# Patient Record
Sex: MALE | Race: WHITE
[De-identification: names, ages, dates, MRNs, and addresses within clinical notes are randomized per-mention and may not be internally consistent; named-entity substitution may affect disease eponyms.]

---

## 2018-04-17 ENCOUNTER — HOSPITAL ENCOUNTER (EMERGENCY)
Dept: HOSPITAL 17 - NEPE | Age: 28
LOS: 1 days | Discharge: HOME | End: 2018-04-18
Payer: COMMERCIAL

## 2018-04-17 VITALS
OXYGEN SATURATION: 98 % | SYSTOLIC BLOOD PRESSURE: 132 MMHG | HEART RATE: 70 BPM | RESPIRATION RATE: 16 BRPM | DIASTOLIC BLOOD PRESSURE: 86 MMHG

## 2018-04-17 VITALS
RESPIRATION RATE: 18 BRPM | HEART RATE: 73 BPM | TEMPERATURE: 99.3 F | SYSTOLIC BLOOD PRESSURE: 129 MMHG | DIASTOLIC BLOOD PRESSURE: 79 MMHG | OXYGEN SATURATION: 99 %

## 2018-04-17 VITALS — WEIGHT: 154.32 LBS | HEIGHT: 63 IN | BODY MASS INDEX: 27.34 KG/M2
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
## 2018-04-17 DIAGNOSIS — M25.511: ICD-10-CM

## 2018-04-17 DIAGNOSIS — S09.90XA: Primary | ICD-10-CM

## 2018-04-17 DIAGNOSIS — R07.9: ICD-10-CM

## 2018-04-17 DIAGNOSIS — M54.2: ICD-10-CM

## 2018-04-17 DIAGNOSIS — S60.512A: ICD-10-CM

## 2018-04-17 DIAGNOSIS — Z23: ICD-10-CM

## 2018-04-17 LAB
BASOPHILS # BLD AUTO: 0.1 TH/MM3 (ref 0–0.2)
BASOPHILS NFR BLD: 0.8 % (ref 0–2)
BUN SERPL-MCNC: 17 MG/DL (ref 7–18)
CALCIUM SERPL-MCNC: 8.8 MG/DL (ref 8.5–10.1)
CHLORIDE SERPL-SCNC: 108 MEQ/L (ref 98–107)
CREAT SERPL-MCNC: 1.11 MG/DL (ref 0.6–1.3)
EOSINOPHIL # BLD: 0.3 TH/MM3 (ref 0–0.4)
EOSINOPHIL NFR BLD: 3.2 % (ref 0–4)
ERYTHROCYTE [DISTWIDTH] IN BLOOD BY AUTOMATED COUNT: 12.9 % (ref 11.6–17.2)
GFR SERPLBLD BASED ON 1.73 SQ M-ARVRAT: 79 ML/MIN (ref 89–?)
GLUCOSE SERPL-MCNC: 84 MG/DL (ref 74–106)
HCO3 BLD-SCNC: 25.6 MEQ/L (ref 21–32)
HCT VFR BLD CALC: 47.2 % (ref 39–51)
HGB BLD-MCNC: 16.5 GM/DL (ref 13–17)
INR PPP: 1 RATIO
LYMPHOCYTES # BLD AUTO: 3 TH/MM3 (ref 1–4.8)
LYMPHOCYTES NFR BLD AUTO: 38.7 % (ref 9–44)
MCH RBC QN AUTO: 31.7 PG (ref 27–34)
MCHC RBC AUTO-ENTMCNC: 34.8 % (ref 32–36)
MCV RBC AUTO: 90.9 FL (ref 80–100)
MONOCYTE #: 0.7 TH/MM3 (ref 0–0.9)
MONOCYTES NFR BLD: 9 % (ref 0–8)
NEUTROPHILS # BLD AUTO: 3.7 TH/MM3 (ref 1.8–7.7)
NEUTROPHILS NFR BLD AUTO: 48.3 % (ref 16–70)
PLATELET # BLD: 224 TH/MM3 (ref 150–450)
PMV BLD AUTO: 9 FL (ref 7–11)
PROTHROMBIN TIME: 10 SEC (ref 9.8–11.6)
RBC # BLD AUTO: 5.2 MIL/MM3 (ref 4.5–5.9)
SODIUM SERPL-SCNC: 141 MEQ/L (ref 136–145)
WBC # BLD AUTO: 7.7 TH/MM3 (ref 4–11)

## 2018-04-17 PROCEDURE — 74177 CT ABD & PELVIS W/CONTRAST: CPT

## 2018-04-17 PROCEDURE — 71260 CT THORAX DX C+: CPT

## 2018-04-17 PROCEDURE — 96374 THER/PROPH/DIAG INJ IV PUSH: CPT

## 2018-04-17 PROCEDURE — 85610 PROTHROMBIN TIME: CPT

## 2018-04-17 PROCEDURE — 73030 X-RAY EXAM OF SHOULDER: CPT

## 2018-04-17 PROCEDURE — L0150 CERV SEMI-RIG ADJ MOLDED CHN: HCPCS

## 2018-04-17 PROCEDURE — 90471 IMMUNIZATION ADMIN: CPT

## 2018-04-17 PROCEDURE — 96375 TX/PRO/DX INJ NEW DRUG ADDON: CPT

## 2018-04-17 PROCEDURE — 85025 COMPLETE CBC W/AUTO DIFF WBC: CPT

## 2018-04-17 PROCEDURE — 99285 EMERGENCY DEPT VISIT HI MDM: CPT

## 2018-04-17 PROCEDURE — 85730 THROMBOPLASTIN TIME PARTIAL: CPT

## 2018-04-17 PROCEDURE — 72125 CT NECK SPINE W/O DYE: CPT

## 2018-04-17 PROCEDURE — 90714 TD VACC NO PRESV 7 YRS+ IM: CPT

## 2018-04-17 PROCEDURE — 72132 CT LUMBAR SPINE W/DYE: CPT

## 2018-04-17 PROCEDURE — 70450 CT HEAD/BRAIN W/O DYE: CPT

## 2018-04-17 PROCEDURE — 80048 BASIC METABOLIC PNL TOTAL CA: CPT

## 2018-04-17 PROCEDURE — 71045 X-RAY EXAM CHEST 1 VIEW: CPT

## 2018-04-17 PROCEDURE — 72129 CT CHEST SPINE W/DYE: CPT

## 2018-04-17 NOTE — RADRPT
EXAM DATE/TIME:  04/17/2018 23:00 

 

[facility] COMPARISON:     

No previous studies available for comparison.

 

                     

INDICATIONS :     

Short of breath, chest pain.

                     

 

MEDICAL HISTORY :     

None.          

 

SURGICAL HISTORY :     

None.   

 

ENCOUNTER:     

Initial                                        

 

ACUITY:     

1 day      

 

PAIN SCORE:     

0/10

 

LOCATION:     

Bilateral  chest

 

FINDINGS:     

A single view of the chest demonstrates the lungs to be symmetrically aerated without evidence of mas
s, infiltrate or effusion.  The cardiomediastinal contours are unremarkable.  Osseous structures are 
intact.

 

CONCLUSION:     

No acute abnormality demonstrated. Chest CT to follow.

 

 

 

 Dewayne Bardales MD on April 17, 2018 at 23:26           

Board Certified Radiologist.

 This report was verified electronically.

## 2018-04-17 NOTE — RADRPT
EXAM DATE/TIME:  04/17/2018 23:28 

 

[facility] COMPARISON:     

No previous studies available for comparison.

 

 

INDICATIONS :     

Trauma, fell off roof yesterday.

                      

 

IV CONTRAST:     

100 cc Omnipaque 350 (iohexol) IV ; Cumulative dose for multiple exams.

 

 

ORAL CONTRAST:      

No oral contrast ingested.

                      

 

RADIATION DOSE:     

9.96 CTDIvol (mGy) ; Combined studies - Thorax/Abdomen/Pelvis

 

 

MEDICAL HISTORY :     

None  

 

SURGICAL HISTORY :      

None. 

 

ENCOUNTER:      

Initial

 

ACUITY:      

2 days

 

PAIN SCALE:      

0/10

 

LOCATION:         

Abdomen. 

 

TECHNIQUE:     

Volumetric scanning of the abdomen and pelvis was performed.  Using automated exposure control and ad
justment of the mA and/or kV according to patient size, radiation dose was kept as low as reasonably 
achievable to obtain optimal diagnostic quality images.  DICOM format image data is available electro
nically for review and comparison.  

 

FINDINGS:     

 

LOWER LUNGS:     

The visualized lower lungs are clear.

 

LIVER:     

Homogeneous density without lesion.  There is no dilation of the biliary tree.  No calcified gallston
es.

 

SPLEEN:     

Normal size without lesion.

 

PANCREAS:     

Within normal limits.

 

KIDNEYS:     

Normal in size and shape.  There is no mass, stone or hydronephrosis.

 

ADRENAL GLANDS:     

Within normal limits.

 

VASCULAR:     

There is no aortic aneurysm.

 

BOWEL/MESENTERY:     

The stomach, small bowel, and colon demonstrate no acute abnormality.  There is no free intraperitone
al air or fluid.

 

ABDOMINAL WALL:     

Within normal limits.

 

RETROPERITONEUM:     

There is no lymphadenopathy. 

 

BLADDER:     

No wall thickening or mass. 

 

REPRODUCTIVE:     

Within normal limits.

 

INGUINAL:     

There is no lymphadenopathy or hernia. 

 

MUSCULOSKELETAL:     

Within normal limits for patient age. 

 

CONCLUSION:     

Negative trauma CT of the abdomen and pelvis.

 

 

 

 Dewayne Bardales MD on April 17, 2018 at 23:56           

Board Certified Radiologist.

 This report was verified electronically.

## 2018-04-17 NOTE — PD
HPI


Chief Complaint:  Fall


Time Seen by Provider:  22:49


Travel History


International Travel<30 days:  No


Contact w/Intl Traveler<30days:  No


Traveled to known affect area:  No





History of Present Illness


HPI


This is a 27-year-old male who presents by private vehicle for evaluation after 

fall.  Reports that yesterday at 3:30 PM he fell off the roof of a single-story 

house and landed on his right shoulder, hip his head against a fence.  Denies 

loss of consciousness.  He was sent home from work.  Since then he has 

developed worsening pain on the right side of his head, right side of his chest

, right shoulder, neck and back.  Pain is sharp and constant worse with 

movement.  Primary pain is right shoulder.  He denies shortness of breath, 

nausea or vomiting, dizziness, lightheadedness, denies numbness or tingling or 

weakness in extremities.  He denies any abdominal pain, pain in the lower 

extremities.  He is not on any blood thinning medications.  He reports a small 

abrasion on his left hand.  Last tetanus vaccination unknown.  No other 

complaints.





[facility]


Past Medical History


Medical History:  Denies Significant Hx


Diminished Hearing:  No


Tetanus Vaccination:  Unknown





Past Surgical History


Surgical History:  No Previous Surgery





Social History


Alcohol Use:  Yes (occasionally)


Tobacco Use:  No


Substance Use:  No





Allergies-Medications


(Allergen,Severity, Reaction):  


Coded Allergies:  


     No Known Allergies (Unverified , 4/17/18)


Reported Meds & Prescriptions





Reported Meds & Active Scripts


Active


Hydrocodone-Acetaminophen 5-325 mg Tab 1 Tab PO Q6H PRN








Review of Systems


Except as stated in HPI:  all other systems reviewed are Neg





Physical Exam


Narrative


GENERAL: This is well-developed well-nourished male in no acute distress.  

Cervical collar is in place.


SKIN: Warm and dry.  Small abrasion on the dorsum of the left hand.


HEAD: Atraumatic. Normocephalic. 


EYES: Pupils equal and round. No scleral icterus. No injection or drainage. 


ENT: No nasal bleeding or discharge.  Mucous membranes pink and moist.


NECK: Trachea midline. No JVD. 


CARDIOVASCULAR: Regular rate and rhythm.  No murmur appreciated.


RESPIRATORY: No accessory muscle use. Clear to auscultation. Breath sounds 

equal bilaterally. 


GASTROINTESTINAL: Abdomen soft, non-tender, nondistended. Hepatic and splenic 

margins not palpable. 


MUSCULOSKELETAL: No obvious deformities.  There is generalized tenderness to 

palpation of the right shoulder joint.  There is tenderness to palpation along 

the cervical thoracic and lumbar midline spine.  The patient has pain with 

range of motion activities of the right shoulder.  Right shoulder abduction, 

external rotation is slightly limited.  There is some tenderness to palpation 

of the anterior right upper chest wall.  There is no bony crepitus.


NEUROLOGICAL: Awake and alert. No obvious cranial nerve deficits.  Motor 

grossly within normal limits. Normal speech.





Data


Data


Last Documented VS





Vital Signs








  Date Time  Temp Pulse Resp B/P (MAP) Pulse Ox O2 Delivery O2 Flow Rate FiO2


 


4/18/18 01:54        


 


4/17/18 23:51  70 16  98 Room Air  


 


4/17/18 22:34 99.3       








Orders





 Orders


Chest, Single Ap (4/17/18 22:50)


Ct Brain W/O Iv Contrast(Rout) (4/17/18 22:50)


Ct Cerv Spine W/O Contrast (4/17/18 22:50)


Ct Thorax/ Chest W Iv Contrast (4/17/18 22:50)


Ct Thor Spine W Iv Contrast (4/17/18 22:50)


Ct Lumb Spine W Iv Contrast (4/17/18 22:50)


Apply Cervical Collar (4/17/18 22:50)


Iv Access Insert/Monitor (4/17/18 22:50)


Shoulder, Complete (>2vws) (4/17/18 )


Basic Metabolic Panel (Bmp) (4/17/18 22:50)


Complete Blood Count With Diff (4/17/18 22:50)


Prothrombin Time / Inr (Pt) (4/17/18 22:50)


Act Partial Throm Time (Ptt) (4/17/18 22:50)


Morphine Inj (Morphine Inj) (4/17/18 23:00)


Ondansetron Inj (Zofran Inj) (4/17/18 23:00)


Tetanus/Diphtheria Tox Adult (Tetanus/Di (4/17/18 23:00)


Shoulder, Complete (>2vws) (4/17/18 )


Iohexol 350 Inj (Omnipaque 350 Inj) (4/17/18 23:39)


Ct Abd/Pel W Iv Contrast(Rout) (4/17/18 )


Ice/Cold Pack (4/18/18 01:14)


Splint Or Brace Apply/Monitor (4/18/18 01:14)


Collar [place] (4/18/18 )


Ed Discharge Order (4/18/18 01:22)


Sling Cradle Arm (4/18/18 )





Labs





Laboratory Tests








Test


  4/17/18


22:58


 


White Blood Count 7.7 TH/MM3 


 


Red Blood Count 5.20 MIL/MM3 


 


Hemoglobin 16.5 GM/DL 


 


Hematocrit 47.2 % 


 


Mean Corpuscular Volume 90.9 FL 


 


Mean Corpuscular Hemoglobin 31.7 PG 


 


Mean Corpuscular Hemoglobin


Concent 34.8 % 


 


 


Red Cell Distribution Width 12.9 % 


 


Platelet Count 224 TH/MM3 


 


Mean Platelet Volume 9.0 FL 


 


Neutrophils (%) (Auto) 48.3 % 


 


Lymphocytes (%) (Auto) 38.7 % 


 


Monocytes (%) (Auto) 9.0 % 


 


Eosinophils (%) (Auto) 3.2 % 


 


Basophils (%) (Auto) 0.8 % 


 


Neutrophils # (Auto) 3.7 TH/MM3 


 


Lymphocytes # (Auto) 3.0 TH/MM3 


 


Monocytes # (Auto) 0.7 TH/MM3 


 


Eosinophils # (Auto) 0.3 TH/MM3 


 


Basophils # (Auto) 0.1 TH/MM3 


 


CBC Comment DIFF FINAL 


 


Differential Comment  


 


Prothrombin Time 10.0 SEC 


 


Prothromb Time International


Ratio 1.0 RATIO 


 


 


Activated Partial


Thromboplast Time 27.3 SEC 


 


 


Blood Urea Nitrogen 17 MG/DL 


 


Creatinine 1.11 MG/DL 


 


Random Glucose 84 MG/DL 


 


Calcium Level 8.8 MG/DL 


 


Sodium Level 141 MEQ/L 


 


Potassium Level 3.7 MEQ/L 


 


Chloride Level 108 MEQ/L 


 


Carbon Dioxide Level 25.6 MEQ/L 


 


Anion Gap 7 MEQ/L 


 


Estimat Glomerular Filtration


Rate 79 ML/MIN 


 











[facility]


Medical Decision Making


Medical Screen Exam Complete:  Yes


Emergency Medical Condition:  Yes


Medical Record Reviewed:  Yes


Differential Diagnosis


Rotator cuff tear versus proximal humeral fracture versus acromioclavicular 

separation versus rib fracture versus pneumothorax versus hemothorax versus 

vertebral fracture versus spinal cord injury versus closed head injury


Narrative Course


27-year-old male presents with right-sided headache, neck and back pain, right 

shoulder and right chest wall pain after falling off of a roof yesterday.  On 

examination he does have tenderness to palpation along the cervical thoracic 

lumbar midline and therefore cervical collar will be maintained.  CT imaging of 

the cervical, thoracic, lumbar spine, thorax, brain have been ordered.  Chest x-

ray and right shoulder x-ray have been ordered.  Morphine and Zofran has been 

ordered.





At the end of my shift the patient was signed out pending imaging results.


Scripts


Hydrocodone-Acetaminophen (Hydrocodone-Acetaminophen) 5-325 mg Tab


1 TAB PO Q6H Y for PAIN, #12 TAB 0 Refills


   Prov: Elizabeth Mckeon MD         4/18/18











Doron Reno Apr 17, 2018 22:54

## 2018-04-17 NOTE — RADRPT
EXAM DATE/TIME:  04/17/2018 23:01 

 

[facility] COMPARISON:     

No previous studies available for comparison.

 

                     

INDICATIONS :     

Shoulder pain.

                     

 

MEDICAL HISTORY :     

None.          

 

SURGICAL HISTORY :     

None.   

 

ENCOUNTER:     

Initial                                        

 

ACUITY:     

1 day      

 

PAIN SCORE:     

9/10

 

LOCATION:     

Right  shoulder

 

FINDINGS:     

Multiple view examination of the right shoulder demonstrates no evidence of fracture or dislocation. 
 The glenohumeral and acromioclavicular joints are maintained.  There is normal range of motion betwe
en internal and external rotation.  Bony mineralization is normal.

 

CONCLUSION:     

No fracture or subluxation of the right shoulder.

 

 

 

 Dewayne Bardales MD on April 17, 2018 at 23:27           

Board Certified Radiologist.

 This report was verified electronically.

## 2018-04-17 NOTE — PD
Data


Data


Last Documented VS





Vital Signs








  Date Time  Temp Pulse Resp B/P (MAP) Pulse Ox O2 Delivery O2 Flow Rate FiO2


 


4/17/18 23:51  70 16 132/86 (101) 98 Room Air  


 


4/17/18 22:34 99.3       








Orders





 Orders


Chest, Single Ap (4/17/18 22:50)


Ct Brain W/O Iv Contrast(Rout) (4/17/18 22:50)


Ct Cerv Spine W/O Contrast (4/17/18 22:50)


Ct Thorax/ Chest W Iv Contrast (4/17/18 22:50)


Ct Thor Spine W Iv Contrast (4/17/18 22:50)


Ct Lumb Spine W Iv Contrast (4/17/18 22:50)


Apply Cervical Collar (4/17/18 22:50)


Iv Access Insert/Monitor (4/17/18 22:50)


Shoulder, Complete (>2vws) (4/17/18 )


Basic Metabolic Panel (Bmp) (4/17/18 22:50)


Complete Blood Count With Diff (4/17/18 22:50)


Prothrombin Time / Inr (Pt) (4/17/18 22:50)


Act Partial Throm Time (Ptt) (4/17/18 22:50)


Morphine Inj (Morphine Inj) (4/17/18 23:00)


Ondansetron Inj (Zofran Inj) (4/17/18 23:00)


Tetanus/Diphtheria Tox Adult (Tetanus/Di (4/17/18 23:00)


Shoulder, Complete (>2vws) (4/17/18 )


Iohexol 350 Inj (Omnipaque 350 Inj) (4/17/18 23:39)


Ct Abd/Pel W Iv Contrast(Rout) (4/17/18 )


Ice/Cold Pack (4/18/18 01:14)


Splint Or Brace Apply/Monitor (4/18/18 01:14)


Collar [place] (4/18/18 )





Labs





Laboratory Tests








Test


  4/17/18


22:58


 


White Blood Count 7.7 TH/MM3 


 


Red Blood Count 5.20 MIL/MM3 


 


Hemoglobin 16.5 GM/DL 


 


Hematocrit 47.2 % 


 


Mean Corpuscular Volume 90.9 FL 


 


Mean Corpuscular Hemoglobin 31.7 PG 


 


Mean Corpuscular Hemoglobin


Concent 34.8 % 


 


 


Red Cell Distribution Width 12.9 % 


 


Platelet Count 224 TH/MM3 


 


Mean Platelet Volume 9.0 FL 


 


Neutrophils (%) (Auto) 48.3 % 


 


Lymphocytes (%) (Auto) 38.7 % 


 


Monocytes (%) (Auto) 9.0 % 


 


Eosinophils (%) (Auto) 3.2 % 


 


Basophils (%) (Auto) 0.8 % 


 


Neutrophils # (Auto) 3.7 TH/MM3 


 


Lymphocytes # (Auto) 3.0 TH/MM3 


 


Monocytes # (Auto) 0.7 TH/MM3 


 


Eosinophils # (Auto) 0.3 TH/MM3 


 


Basophils # (Auto) 0.1 TH/MM3 


 


CBC Comment DIFF FINAL 


 


Differential Comment  


 


Prothrombin Time 10.0 SEC 


 


Prothromb Time International


Ratio 1.0 RATIO 


 


 


Activated Partial


Thromboplast Time 27.3 SEC 


 


 


Blood Urea Nitrogen 17 MG/DL 


 


Creatinine 1.11 MG/DL 


 


Random Glucose 84 MG/DL 


 


Calcium Level 8.8 MG/DL 


 


Sodium Level 141 MEQ/L 


 


Potassium Level 3.7 MEQ/L 


 


Chloride Level 108 MEQ/L 


 


Carbon Dioxide Level 25.6 MEQ/L 


 


Anion Gap 7 MEQ/L 


 


Estimat Glomerular Filtration


Rate 79 ML/MIN 


 











[facility]


Medical Record Reviewed:  Yes


Supervised Visit with ISA:  Yes


Interpretation(s)





Last Impressions








Thoracic Spine CT 4/17/18 2250 Signed





Impressions: 





 Service Date/Time:  Tuesday, April 17, 2018 23:28 - CONCLUSION:  Intact 

thoracic 





 spine.     Dewayne Bardales MD 


 


Lumbar Spine CT 4/17/18 2250 Signed





Impressions: 





 Service Date/Time:  Tuesday, April 17, 2018 23:28 - CONCLUSION:  Intact lumbar 





 spine.     Dewayne Bardales MD 


 


Head CT 4/17/18 2250 Signed





Impressions: 





 Service Date/Time:  Tuesday, April 17, 2018 23:28 - CONCLUSION:  Negative 





 noncontrast head CT.     Dewayne Bardales MD 


 


Chest X-Ray 4/17/18 2250 Signed





Impressions: 





 Service Date/Time:  Tuesday, April 17, 2018 23:00 - CONCLUSION:  No acute 





 abnormality demonstrated. Chest CT to follow.     Dewayne Bardales MD 


 


Chest CT 4/17/18 2250 Signed





Impressions: 





 Service Date/Time:  Tuesday, April 17, 2018 23:28 - CONCLUSION:  Negative 

trauma 





 chest CT.     Dewayne Bardales MD 


 


Cervical Spine CT 4/17/18 2250 Signed





Impressions: 





 Service Date/Time:  Tuesday, April 17, 2018 23:28 - CONCLUSION:  Intact 

cervical 





 spine.     Dewayne Bardales MD 


 


Shoulder X-Ray 4/17/18 0000 Signed





Impressions: 





 Service Date/Time:  Tuesday, April 17, 2018 23:01 - CONCLUSION:  No fracture 

or 





 subluxation of the right shoulder.     Dewayne Bardales MD 


 


Abdomen/Pelvis CT 4/17/18 0000 Signed





Impressions: 





 Service Date/Time:  Tuesday, April 17, 2018 23:28 - CONCLUSION:  Negative 

trauma 





 CT of the abdomen and pelvis.     Dewayne Bardales MD 








Narrative Course


During the course of the patient's emergency department visit, the patient's 

history, examination, and differential diagnosis were reviewed with the 

patient. The patient was placed on a cardiac monitor with oximetry and frequent 

blood pressure monitoring. The patient had  IV access obtained and blood work 

sent for analysis.  The patient was initially evaluated by Doron garcia physician 

assistant.  The patient's case was staffed with me.  At the conclusion of his 

shift the patient's case was checked out to me pending laboratory studies and 

imaging studies.  The patient reportedly fell off of a roof that was a one 

story roof yesterday around 3 PM.  Patient complained of headache, neck pain, 

right shoulder pain, and back pain.





The patient was initially provided an update to his tetanus, morphine and 

Zofran for pain and nausea





The patient's laboratory studies were reviewed and remarkable for a CBC that 

shows a white count of 7.7, hemoglobin 16.5, platelets 224 with 9 monocytes, 

basic metabolic profile is remarkable for chloride of 108, GFR of 79, PT PTT 

within normal limits.





Radiology studies were reviewed and remarkable for a chest x-ray, right 

shoulder x-ray that showed no acute abnormality.  CT scan of the head, neck, 

chest, abdomen and pelvis, T-spine, L-spine showed no acute abnormality.





The patient's shoulder examination is suspicious for a rotator cuff injury.  

Patient will be given a sling.  The patient is given information regarding 

following up with the orthopedic physician on-call.  The patient was given Dr. Monae's information.  The patient was instructed to call in the morning for a 

follow-up appointment in a week.  The patient was given a prescription for pain 

medication at discharge.





The patient is resting comfortably and feels better, is alert and in no 

distress. The patient's results and examination findings were discussed with 

the patient. The repeat examination is unremarkable and benign. The history, 

exam, diagnostic testing, and current condition do not suggest any significant 

pathology to warrant further testing, continued ED treatment, admission, or 

surgical evaluation at this point. The vital signs have been stable. The 

patient does not have uncontrollable pain, intractable vomiting, or other 

significant symptoms. The patient's condition is stable and appropriate for 

discharge. The patient will pursue further outpatient evaluation with a primary 

care physician or other designated or consulting physician as indicated in the 

discharge instructions. The patient expressed understanding and was agreeable 

with this plan.


Diagnosis





 Primary Impression:  


 Head injury


 Qualified Codes:  S09.90XA - Unspecified injury of head, initial encounter


 Additional Impressions:  


 Neck pain


 Shoulder pain, right


 Qualified Codes:  M25.511 - Pain in right shoulder


Referrals:  


Jerome Monae MD


1 week





[facility]


Patient Instructions:  General Instructions, Shoulder Pain (ED)


***Med/Other Pt SpecificInfo:  Prescription(s) given


Scripts


Hydrocodone-Acetaminophen (Hydrocodone-Acetaminophen) 5-325 mg Tab


1 TAB PO Q6H Y for PAIN, #12 TAB 0 Refills


   Prov: Elizabeth Mckeon MD         4/18/18


Disposition:  01 DISCHARGE HOME


Condition:  Stable











Elizabeth Mckeon MD Apr 17, 2018 23:03

## 2018-04-17 NOTE — RADRPT
EXAM DATE/TIME:  04/17/2018 23:28 

 

[facility] COMPARISON:     

No previous studies available for comparison.

 

 

INDICATIONS :     

Trauma, fell off roof yesterday.

                      

 

RADIATION DOSE:     

18.53 CTDIvol (mGy) 

 

 

 

MEDICAL HISTORY :     

None  

 

SURGICAL HISTORY :      

None. 

 

ENCOUNTER:      

Initial

 

ACUITY:      

2 days

 

PAIN SCALE:      

4/10

 

LOCATION:        

neck 

 

TECHNIQUE:     

Volumetric scanning of the cervical spine was performed. Multiplanar reconstructions in the sagittal,
 coronal and oblique axial planes were performed.   Using automated exposure control and adjustment o
f the mA and/or kV according to patient size, radiation dose was kept as low as reasonably achievable
 to obtain optimal diagnostic quality images.   DICOM format image data is available electronically f
or review and comparison.  

 

FINDINGS:     

 

VERTEBRAE:     

Normal vertebral body height.

 

ALIGNMENT:     

No evidence of subluxation.

 

C2-C3:  

The bony spinal canal is normal in size.  No evidence of disc bulge or herniation.  The neural forami
na are bilaterally patent.

 

C3-C4:  

The bony spinal canal is normal in size.  No evidence of disc bulge or herniation.  The neural forami
na are bilaterally patent.

 

C4-C5:  

The bony spinal canal is normal in size.  No evidence of disc bulge or herniation.  The neural forami
na are bilaterally patent.

 

C5-C6:  

The bony spinal canal is normal in size.  No evidence of disc bulge or herniation.  The neural forami
na are bilaterally patent.

 

C6-C7:  

The bony spinal canal is normal in size.  No evidence of disc bulge or herniation.  The neural forami
na are bilaterally patent.

 

C7-T1:  

The bony spinal canal is normal in size.  No evidence of disc bulge or herniation.  The neural forami
na are bilaterally patent.

 

CONCLUSION:     

Intact cervical spine.

 

 

 

 Dewayne Bardales MD on April 17, 2018 at 23:42           

Board Certified Radiologist.

 This report was verified electronically.

## 2018-04-17 NOTE — RADRPT
EXAM DATE/TIME:  04/17/2018 23:28 

 

[facility] COMPARISON:     

No previous studies available for comparison.

 

 

INDICATIONS :     

Trauma, fell off roof yesterday.

                      

 

RADIATION DOSE:     

56.35 CTDIvol (mGy) 

 

 

 

MEDICAL HISTORY :     

None  

 

SURGICAL HISTORY :      

None. 

 

ENCOUNTER:      

Initial

 

ACUITY:      

2 days

 

PAIN SCALE:      

0/10

 

LOCATION:        

cranial 

 

TECHNIQUE:     

Multiple contiguous axial images were obtained of the head.  Using automated exposure control and adj
ustment of the mA and/or kV according to patient size, radiation dose was kept as low as reasonably a
chievable to obtain optimal diagnostic quality images.   DICOM format image data is available electro
nically for review and comparison.  

 

FINDINGS:     

 

CEREBRUM:     

The ventricles are normal for age.  No evidence of midline shift, mass lesion, hemorrhage or acute in
farction.  No extra-axial fluid collections are seen.

 

POSTERIOR FOSSA:     

The cerebellum and brainstem are intact.  The 4th ventricle is midline.  The cerebellopontine angle i
s unremarkable.

 

EXTRACRANIAL:     

The visualized portion of the orbits is intact.

 

SKULL:     

The calvaria is intact.  No evidence of skull fracture.

 

CONCLUSION:     

Negative noncontrast head CT.

 

 

 

 Dewayne Bardales MD on April 17, 2018 at 23:41           

Board Certified Radiologist.

 This report was verified electronically.

## 2018-04-17 NOTE — RADRPT
EXAM DATE/TIME:  04/17/2018 23:28 

 

[facility] COMPARISON:     

No previous studies available for comparison.

 

 

INDICATIONS :     

Trauma, fell off roof yesterday.

                      

 

IV CONTRAST:     

100 cc Omnipaque 350 (iohexol) IV ; Cumulative dose for multiple exams.

 

 

RADIATION DOSE:     

9.96 CTDIvol (mGy) ; Combined studies - Thorax/Abdomen/Pelvis

 

 

MEDICAL HISTORY :     

None  

 

SURGICAL HISTORY :      

None. 

 

ENCOUNTER:      

Initial

 

ACUITY:      

2 days

 

PAIN SCALE:      

0/10

 

LOCATION:        

chest 

 

TECHNIQUE:      

Volumetric scanning of the chest was performed.  Using automated exposure control and adjustment of t
he mA and/or kV according to patient size, radiation dose was kept as low as reasonably achievable to
 obtain optimal diagnostic quality images.   DICOM format image data is available electronically for 
review and comparison.  

 

Follow-up recommendations for detected pulmonary nodules are based at a minimum on nodule size and pa
tient risk factors according to Fleischner Society Guidelines.

 

FINDINGS:     

 

LUNGS:     

There is no consolidation or pneumothorax.  No concerning pulmonary nodule is visualized.

 

PLEURA:     

There is no pleural thickening or pleural effusion.

 

MEDIASTINUM:     

The heart and great vessels demonstrate no acute abnormality.  There is no mediastinal or hilar lymph
adenopathy.

 

AXILLAE:     

Within normal limits.  No lymphadenopathy.

 

SKELETAL:     

Within normal limits for patient age.

 

MISCELLANEOUS:     

The visualized upper abdominal organs demonstrate no acute abnormality.

 

CONCLUSION:     

Negative trauma chest CT.

 

 

 

 Dewayne Bardales MD on April 17, 2018 at 23:49           

Board Certified Radiologist.

 This report was verified electronically.

## 2018-04-18 NOTE — RADRPT
EXAM DATE/TIME:  04/17/2018 23:28 

 

[facility] COMPARISON:     

No previous studies available for comparison.

 

 

INDICATIONS :     

Trauma, fell off roof yesterday.

                      

 

IV CONTRAST:     

100 cc Omnipaque 350 (iohexol) IV ; Cumulative dose for multiple exams.

 

 

RADIATION DOSE:       

; Reconstructed from previous dataset, no dose

 

 

MEDICAL HISTORY :     

None  

 

SURGICAL HISTORY :      

None. 

 

ENCOUNTER:      

Initial

 

ACUITY:      

2 days

 

PAIN SCALE:      

0/10

 

LOCATION:        

Paraspinal 

 

TECHNIQUE:     

Volumetric scanning of the lumbar spine was performed.  Multiplanar reconstructions in the sagittal, 
coronal and oblique axial planes were performed.  Using automated exposure control and adjustment of 
the mA and/or kV according to patient size, radiation dose was kept as low as reasonably achievable t
o obtain optimal diagnostic quality images.  DICOM format image data is available electronically for 
review and comparison.  

 

FINDINGS:     

 

CONUS MEDULLARIS:     

Normal.

 

PARASPINAL SOFT TISSUES:     

Normal.

 

LUMBAR CORD:     

Normal.

 

DURAL SAC:     

Normal.

 

L1-L2:  The disc, uncovertebral joints, central canal, foramina, and facets are normal.

 

L2-L3:  The disc, uncovertebral joints, central canal, foramina, and facets are normal.

 

L3-L4:  

The disc, uncovertebral joints, central canal, foramina, and facets are normal.

 

L4-L5:  The disc, uncovertebral joints, central canal, foramina, and facets are normal.

 

L5-S1:  The disc, uncovertebral joints, central canal, foramina, and facets are normal.

 

CONCLUSION:     

Intact lumbar spine.

 

 

 

 Dewayne Bardales MD on April 18, 2018 at 0:11           

Board Certified Radiologist.

 This report was verified electronically.

## 2018-04-18 NOTE — RADRPT
EXAM DATE/TIME:  04/17/2018 23:28 

 

[facility] COMPARISON:     

No previous studies available for comparison.

 

 

INDICATIONS :     

Trauma, fell off roof yesterday.

                      

 

IV CONTRAST:     

100 cc Omnipaque 350 (iohexol) IV ; Cumulative dose for multiple exams.

 

 

RADIATION DOSE:       

; Reconstructed from previous dataset, no dose

 

 

MEDICAL HISTORY :     

None  

 

SURGICAL HISTORY :      

None. 

 

ENCOUNTER:      

Initial

 

ACUITY:      

2 days

 

PAIN SCALE:      

0/10

 

LOCATION:        

Paraspinal 

 

TECHNIQUE:     

Volumetric scanning of the thoracic spine was performed.  Multiplanar reconstructions in the sagittal
, coronal and oblique axial planes were performed.  Using automated exposure control and adjustment o
f the mA and/or kV according to patient size, radiation dose was kept as low as reasonably achievable
 to obtain optimal diagnostic quality images.  DICOM format image data is available electronically fo
r review and comparison.  

 

FINDINGS:     

The vertebral bodies of the thoracic spine are in normal alignment without evidence of subluxation.  
Vertebral body height is maintained.  No fractures are seen.

 

T1-T2:     

Normal.

 

T2-T3:     

The thecal sac has a normal diameter.  No evidence of disc bulge or protrusion.

 

T3-T4:     

The thecal sac has a normal diameter.  No evidence of disc bulge or protrusion.

 

T4-T5:     

The thecal sac has a normal diameter.  No evidence of disc bulge or protrusion.

 

T5-T6:     

The thecal sac has a normal diameter.  No evidence of disc bulge or protrusion.

 

T6-T7:     

The thecal sac has a normal diameter.  No evidence of disc bulge or protrusion.

 

T7-T8:     

The thecal sac has a normal diameter.  No evidence of disc bulge or protrusion.

 

T8-T9:     

The thecal sac has a normal diameter.  No evidence of disc bulge or protrusion.

 

T9-T10:   

The thecal sac has a normal diameter.  No evidence of disc bulge or protrusion.

 

T10-T11: 

The thecal sac has a normal diameter.  No evidence of disc bulge or protrusion.

 

T11-T12: 

The thecal sac has a normal diameter.  No evidence of disc bulge or protrusion.

 

T12-L1:   

The thecal sac has a normal diameter.  No evidence of disc bulge or protrusion.

 

CONCLUSION:     

Intact thoracic spine.

 

 

 

 Dewayne Bardales MD on April 18, 2018 at 0:09           

Board Certified Radiologist.

 This report was verified electronically.